# Patient Record
(demographics unavailable — no encounter records)

---

## 2024-10-16 NOTE — REVIEW OF SYSTEMS
[Appropriate Age Development] : development appropriate for age [No Acute Changes] : No acute changes since previous visit [Change in Activity] : no change in activity [Limping] : no limping [Joint Pains] : no arthralgias

## 2024-10-16 NOTE — DATA REVIEWED
[de-identified] : AP/Frog pelvis XR:  Everything looks anatomically correct. Normal indices. The hips are well seated within the joint.

## 2024-10-16 NOTE — PHYSICAL EXAM
[FreeTextEntry1] : Hips:  Full active and passive ROM with no signs of adductor or abductor tightness. There is no crepitus or stiffness with ROM. Full muscle strength 5/5 with intact DTRs of the LE. There is no muscle atrophy noted. There is no pain elicited with palpation over the groin, ASIS, rectus femoris origin, or greater trochanter. There is no discomfort over the iliac crest or IT band. Negative SLR. Negative Devan test. Negative Trendelenburg's test. Negative Robert's test. No signs of anterior femoral impingement. No leg length discrepancy. Negative Galeazzi. There is no discomfort in the lower back. The joint is stable with stress maneuvers. There is no active knee pain.

## 2024-10-16 NOTE — DATA REVIEWED
[de-identified] : AP/Frog pelvis XR:  Everything looks anatomically correct. Normal indices. The hips are well seated within the joint.

## 2024-10-16 NOTE — ASSESSMENT
[FreeTextEntry1] : 2F with intoeing  Today's assessment was performed with the assistance of the patient's parent as an independent historian given the patient's age, who could not be considered a reliable history/due to the nonverbal nature given the patient's young age.   Clinical exam and imaging reviewed with the parent at length. AP/Frog pelvis x-rays are within normal limits. Natural history of internal tibial torsion and femoral anteversion discussed at length. Lower extremity alignment should improve as child ages and her glutes and core muscles strengthen. Parents should notice significant improvement in intoeing by age 3 but this will continue to improve until about age 8 years. Range of normal lower extremity alignment has been discussed. Frequent falls at this age are common. Child does not develop balance and coordination until about age 3 years. Child may continue to participate in activities as tolerated. Return for follow up on prn basis. All questions answered, understanding verbalized. parent in agreement with plan of care.    Documented by Chloé العراقي acting as a scribe for Dr. Grady on 10/15/2024.   The above documentation completed by the scribe is an accurate record of both my words and actions.

## 2024-10-16 NOTE — HISTORY OF PRESENT ILLNESS
[FreeTextEntry1] : Oliverio is a 2-year-old female who presents today with her father for an initial evaluation of her hips. Patient's father expresses concern over child's left leg which turns in when she walks. This does not limit her activities. She is otherwise an active and healthy baby girl.  She is running, jumping, and playing with no issues. She is meeting her developmental milestones appropriately. She was born full term via vaginal delivery. Of note, mother was diagnosed with hip dysplasia as an infant which was treated surgically. No other known family history of hip related issues. Here for initial orthopedic evaluation of her hips.